# Patient Record
Sex: FEMALE | Race: WHITE | NOT HISPANIC OR LATINO | Employment: PART TIME | URBAN - METROPOLITAN AREA
[De-identification: names, ages, dates, MRNs, and addresses within clinical notes are randomized per-mention and may not be internally consistent; named-entity substitution may affect disease eponyms.]

---

## 2020-05-08 ENCOUNTER — HOSPITAL ENCOUNTER (OUTPATIENT)
Dept: RADIOLOGY | Facility: MEDICAL CENTER | Age: 30
End: 2020-05-08
Attending: FAMILY MEDICINE

## 2020-05-08 DIAGNOSIS — R10.2 PELVIC AND PERINEAL PAIN: ICD-10-CM

## 2020-05-08 PROCEDURE — 76830 TRANSVAGINAL US NON-OB: CPT

## 2022-02-28 ENCOUNTER — HOSPITAL ENCOUNTER (OUTPATIENT)
Dept: RADIOLOGY | Facility: MEDICAL CENTER | Age: 32
End: 2022-02-28
Attending: OBSTETRICS & GYNECOLOGY
Payer: COMMERCIAL

## 2022-02-28 DIAGNOSIS — N91.2 ABSENCE OF MENSTRUATION: ICD-10-CM

## 2022-02-28 PROCEDURE — 76830 TRANSVAGINAL US NON-OB: CPT

## 2022-07-29 ENCOUNTER — PRE-ADMISSION TESTING (OUTPATIENT)
Dept: ADMISSIONS | Facility: MEDICAL CENTER | Age: 32
End: 2022-07-29
Attending: OTOLARYNGOLOGY
Payer: COMMERCIAL

## 2022-07-29 RX ORDER — PREDNISONE 10 MG/1
10 TABLET ORAL DAILY
COMMUNITY
Start: 2022-07-12

## 2022-07-29 RX ORDER — SPIRONOLACTONE 50 MG/1
50 TABLET, FILM COATED ORAL
COMMUNITY
Start: 2022-06-20

## 2022-08-19 ENCOUNTER — HOSPITAL ENCOUNTER (OUTPATIENT)
Dept: RADIOLOGY | Facility: MEDICAL CENTER | Age: 32
End: 2022-08-19
Payer: COMMERCIAL

## 2022-08-22 ENCOUNTER — HOSPITAL ENCOUNTER (OUTPATIENT)
Facility: MEDICAL CENTER | Age: 32
End: 2022-08-22
Attending: OTOLARYNGOLOGY | Admitting: OTOLARYNGOLOGY
Payer: COMMERCIAL

## 2022-08-22 VITALS
BODY MASS INDEX: 33.38 KG/M2 | WEIGHT: 207.67 LBS | HEIGHT: 66 IN | TEMPERATURE: 97.6 F | OXYGEN SATURATION: 98 % | HEART RATE: 61 BPM | RESPIRATION RATE: 15 BRPM | SYSTOLIC BLOOD PRESSURE: 128 MMHG | DIASTOLIC BLOOD PRESSURE: 77 MMHG

## 2022-08-22 RX ORDER — SODIUM CHLORIDE, SODIUM LACTATE, POTASSIUM CHLORIDE, CALCIUM CHLORIDE 600; 310; 30; 20 MG/100ML; MG/100ML; MG/100ML; MG/100ML
INJECTION, SOLUTION INTRAVENOUS CONTINUOUS
Status: DISCONTINUED | OUTPATIENT
Start: 2022-08-22 | End: 2022-08-22 | Stop reason: HOSPADM

## 2022-08-22 NOTE — OR NURSING
1105- Dr Sood to bedside, explaing to patient why procedure needs to be canceled at this time.  Pt getting dressed.

## 2022-09-19 ENCOUNTER — PRE-ADMISSION TESTING (OUTPATIENT)
Dept: ADMISSIONS | Facility: MEDICAL CENTER | Age: 32
End: 2022-09-19
Attending: OTOLARYNGOLOGY
Payer: COMMERCIAL

## 2022-09-25 ENCOUNTER — ANESTHESIA EVENT (OUTPATIENT)
Dept: SURGERY | Facility: MEDICAL CENTER | Age: 32
End: 2022-09-25
Payer: COMMERCIAL

## 2022-09-26 ENCOUNTER — HOSPITAL ENCOUNTER (OUTPATIENT)
Facility: MEDICAL CENTER | Age: 32
End: 2022-09-26
Attending: OTOLARYNGOLOGY | Admitting: OTOLARYNGOLOGY
Payer: COMMERCIAL

## 2022-09-26 ENCOUNTER — ANESTHESIA (OUTPATIENT)
Dept: SURGERY | Facility: MEDICAL CENTER | Age: 32
End: 2022-09-26
Payer: COMMERCIAL

## 2022-09-26 VITALS
HEART RATE: 84 BPM | HEIGHT: 66 IN | SYSTOLIC BLOOD PRESSURE: 115 MMHG | BODY MASS INDEX: 33.84 KG/M2 | TEMPERATURE: 97.5 F | RESPIRATION RATE: 18 BRPM | WEIGHT: 210.54 LBS | DIASTOLIC BLOOD PRESSURE: 74 MMHG | OXYGEN SATURATION: 94 %

## 2022-09-26 DIAGNOSIS — J32.0 CHRONIC MAXILLARY SINUSITIS: ICD-10-CM

## 2022-09-26 DIAGNOSIS — J32.2 CHRONIC ETHMOIDAL SINUSITIS: ICD-10-CM

## 2022-09-26 PROBLEM — J32.1 CHRONIC FRONTAL SINUSITIS: Status: ACTIVE | Noted: 2022-09-26

## 2022-09-26 LAB
ANION GAP SERPL CALC-SCNC: 11 MMOL/L (ref 7–16)
BUN SERPL-MCNC: 7 MG/DL (ref 8–22)
CALCIUM SERPL-MCNC: 9 MG/DL (ref 8.5–10.5)
CHLORIDE SERPL-SCNC: 106 MMOL/L (ref 96–112)
CO2 SERPL-SCNC: 23 MMOL/L (ref 20–33)
CREAT SERPL-MCNC: 0.54 MG/DL (ref 0.5–1.4)
GFR SERPLBLD CREATININE-BSD FMLA CKD-EPI: 126 ML/MIN/1.73 M 2
GLUCOSE SERPL-MCNC: 84 MG/DL (ref 65–99)
GRAM STN SPEC: NORMAL
HCG UR QL: NEGATIVE
POTASSIUM SERPL-SCNC: 3.4 MMOL/L (ref 3.6–5.5)
SIGNIFICANT IND 70042: NORMAL
SITE SITE: NORMAL
SODIUM SERPL-SCNC: 140 MMOL/L (ref 135–145)
SOURCE SOURCE: NORMAL

## 2022-09-26 PROCEDURE — A9270 NON-COVERED ITEM OR SERVICE: HCPCS | Performed by: OTOLARYNGOLOGY

## 2022-09-26 PROCEDURE — 160046 HCHG PACU - 1ST 60 MINS PHASE II: Performed by: OTOLARYNGOLOGY

## 2022-09-26 PROCEDURE — 160002 HCHG RECOVERY MINUTES (STAT): Performed by: OTOLARYNGOLOGY

## 2022-09-26 PROCEDURE — 88305 TISSUE EXAM BY PATHOLOGIST: CPT

## 2022-09-26 PROCEDURE — 80048 BASIC METABOLIC PNL TOTAL CA: CPT

## 2022-09-26 PROCEDURE — 160025 RECOVERY II MINUTES (STATS): Performed by: OTOLARYNGOLOGY

## 2022-09-26 PROCEDURE — 160047 HCHG PACU  - EA ADDL 30 MINS PHASE II: Performed by: OTOLARYNGOLOGY

## 2022-09-26 PROCEDURE — 700111 HCHG RX REV CODE 636 W/ 250 OVERRIDE (IP)

## 2022-09-26 PROCEDURE — 700101 HCHG RX REV CODE 250: Performed by: OTOLARYNGOLOGY

## 2022-09-26 PROCEDURE — 700102 HCHG RX REV CODE 250 W/ 637 OVERRIDE(OP): Performed by: ANESTHESIOLOGY

## 2022-09-26 PROCEDURE — 700105 HCHG RX REV CODE 258: Performed by: ANESTHESIOLOGY

## 2022-09-26 PROCEDURE — 87186 SC STD MICRODIL/AGAR DIL: CPT

## 2022-09-26 PROCEDURE — 700101 HCHG RX REV CODE 250: Performed by: ANESTHESIOLOGY

## 2022-09-26 PROCEDURE — 87077 CULTURE AEROBIC IDENTIFY: CPT

## 2022-09-26 PROCEDURE — 87070 CULTURE OTHR SPECIMN AEROBIC: CPT

## 2022-09-26 PROCEDURE — A9270 NON-COVERED ITEM OR SERVICE: HCPCS | Performed by: ANESTHESIOLOGY

## 2022-09-26 PROCEDURE — 81025 URINE PREGNANCY TEST: CPT

## 2022-09-26 PROCEDURE — C2625 STENT, NON-COR, TEM W/DEL SY: HCPCS | Performed by: OTOLARYNGOLOGY

## 2022-09-26 PROCEDURE — 110371 HCHG SHELL REV 272: Performed by: OTOLARYNGOLOGY

## 2022-09-26 PROCEDURE — 160029 HCHG SURGERY MINUTES - 1ST 30 MINS LEVEL 4: Performed by: OTOLARYNGOLOGY

## 2022-09-26 PROCEDURE — 87205 SMEAR GRAM STAIN: CPT

## 2022-09-26 PROCEDURE — 700111 HCHG RX REV CODE 636 W/ 250 OVERRIDE (IP): Performed by: ANESTHESIOLOGY

## 2022-09-26 PROCEDURE — 700102 HCHG RX REV CODE 250 W/ 637 OVERRIDE(OP): Performed by: OTOLARYNGOLOGY

## 2022-09-26 PROCEDURE — 160041 HCHG SURGERY MINUTES - EA ADDL 1 MIN LEVEL 4: Performed by: OTOLARYNGOLOGY

## 2022-09-26 PROCEDURE — 160035 HCHG PACU - 1ST 60 MINS PHASE I: Performed by: OTOLARYNGOLOGY

## 2022-09-26 PROCEDURE — 160048 HCHG OR STATISTICAL LEVEL 1-5: Performed by: OTOLARYNGOLOGY

## 2022-09-26 PROCEDURE — 88311 DECALCIFY TISSUE: CPT

## 2022-09-26 PROCEDURE — 160036 HCHG PACU - EA ADDL 30 MINS PHASE I: Performed by: OTOLARYNGOLOGY

## 2022-09-26 PROCEDURE — 160009 HCHG ANES TIME/MIN: Performed by: OTOLARYNGOLOGY

## 2022-09-26 PROCEDURE — 87075 CULTR BACTERIA EXCEPT BLOOD: CPT

## 2022-09-26 PROCEDURE — 36415 COLL VENOUS BLD VENIPUNCTURE: CPT

## 2022-09-26 PROCEDURE — 00160 ANES PX NOSE&SINUS NOS: CPT | Performed by: ANESTHESIOLOGY

## 2022-09-26 DEVICE — IMPLANT MOMETASONE FUROATE MINI: Type: IMPLANTABLE DEVICE | Site: NOSE | Status: FUNCTIONAL

## 2022-09-26 RX ORDER — MAGNESIUM SULFATE HEPTAHYDRATE 40 MG/ML
INJECTION, SOLUTION INTRAVENOUS
Status: DISCONTINUED | OUTPATIENT
Start: 2022-09-26 | End: 2022-09-26 | Stop reason: SURG

## 2022-09-26 RX ORDER — EPINEPHRINE 1 MG/ML(1)
AMPUL (ML) INJECTION
Status: DISCONTINUED | OUTPATIENT
Start: 2022-09-26 | End: 2022-09-26 | Stop reason: HOSPADM

## 2022-09-26 RX ORDER — BACITRACIN ZINC 500 [USP'U]/G
OINTMENT TOPICAL
Status: DISCONTINUED
Start: 2022-09-26 | End: 2022-09-26 | Stop reason: HOSPADM

## 2022-09-26 RX ORDER — EPINEPHRINE 1 MG/ML
INJECTION INTRAMUSCULAR; INTRAVENOUS; SUBCUTANEOUS
Status: DISCONTINUED
Start: 2022-09-26 | End: 2022-09-26 | Stop reason: HOSPADM

## 2022-09-26 RX ORDER — HYDROMORPHONE HYDROCHLORIDE 1 MG/ML
0.2 INJECTION, SOLUTION INTRAMUSCULAR; INTRAVENOUS; SUBCUTANEOUS
Status: DISCONTINUED | OUTPATIENT
Start: 2022-09-26 | End: 2022-09-26 | Stop reason: HOSPADM

## 2022-09-26 RX ORDER — OXYCODONE HCL 5 MG/5 ML
5 SOLUTION, ORAL ORAL
Status: DISCONTINUED | OUTPATIENT
Start: 2022-09-26 | End: 2022-09-26 | Stop reason: HOSPADM

## 2022-09-26 RX ORDER — TRANEXAMIC ACID 100 MG/ML
INJECTION, SOLUTION INTRAVENOUS PRN
Status: DISCONTINUED | OUTPATIENT
Start: 2022-09-26 | End: 2022-09-26 | Stop reason: SURG

## 2022-09-26 RX ORDER — DEXMEDETOMIDINE HYDROCHLORIDE 100 UG/ML
INJECTION, SOLUTION INTRAVENOUS PRN
Status: DISCONTINUED | OUTPATIENT
Start: 2022-09-26 | End: 2022-09-26 | Stop reason: SURG

## 2022-09-26 RX ORDER — OXYCODONE HCL 5 MG/5 ML
10 SOLUTION, ORAL ORAL
Status: DISCONTINUED | OUTPATIENT
Start: 2022-09-26 | End: 2022-09-26 | Stop reason: HOSPADM

## 2022-09-26 RX ORDER — CEFAZOLIN SODIUM 1 G/3ML
INJECTION, POWDER, FOR SOLUTION INTRAMUSCULAR; INTRAVENOUS PRN
Status: DISCONTINUED | OUTPATIENT
Start: 2022-09-26 | End: 2022-09-26 | Stop reason: SURG

## 2022-09-26 RX ORDER — CELECOXIB 200 MG/1
400 CAPSULE ORAL ONCE
Status: COMPLETED | OUTPATIENT
Start: 2022-09-26 | End: 2022-09-26

## 2022-09-26 RX ORDER — MIDAZOLAM HYDROCHLORIDE 1 MG/ML
INJECTION INTRAMUSCULAR; INTRAVENOUS PRN
Status: DISCONTINUED | OUTPATIENT
Start: 2022-09-26 | End: 2022-09-26 | Stop reason: SURG

## 2022-09-26 RX ORDER — AMOXICILLIN AND CLAVULANATE POTASSIUM 875; 125 MG/1; MG/1
1 TABLET, FILM COATED ORAL 2 TIMES DAILY
Qty: 14 TABLET | Refills: 0 | Status: SHIPPED | OUTPATIENT
Start: 2022-09-26 | End: 2022-10-03

## 2022-09-26 RX ORDER — EPINEPHRINE 1 MG/ML(1)
AMPUL (ML) INJECTION
Status: DISCONTINUED
Start: 2022-09-26 | End: 2022-09-26 | Stop reason: HOSPADM

## 2022-09-26 RX ORDER — IBUPROFEN 800 MG/1
800 TABLET ORAL
Qty: 30 TABLET | Refills: 0 | Status: SHIPPED | OUTPATIENT
Start: 2022-09-26 | End: 2022-10-06

## 2022-09-26 RX ORDER — KETOROLAC TROMETHAMINE 30 MG/ML
INJECTION, SOLUTION INTRAMUSCULAR; INTRAVENOUS PRN
Status: DISCONTINUED | OUTPATIENT
Start: 2022-09-26 | End: 2022-09-26 | Stop reason: SURG

## 2022-09-26 RX ORDER — OXYCODONE HYDROCHLORIDE 5 MG/1
5 TABLET ORAL EVERY 4 HOURS PRN
Qty: 10 TABLET | Refills: 0 | Status: SHIPPED | OUTPATIENT
Start: 2022-09-26 | End: 2022-10-01

## 2022-09-26 RX ORDER — ACETAMINOPHEN 500 MG
1000 TABLET ORAL EVERY 8 HOURS
Qty: 60 TABLET | Refills: 0 | Status: SHIPPED | OUTPATIENT
Start: 2022-09-26 | End: 2022-10-06

## 2022-09-26 RX ORDER — HALOPERIDOL 5 MG/ML
1 INJECTION INTRAMUSCULAR
Status: DISCONTINUED | OUTPATIENT
Start: 2022-09-26 | End: 2022-09-26 | Stop reason: HOSPADM

## 2022-09-26 RX ORDER — HYDRALAZINE HYDROCHLORIDE 20 MG/ML
5 INJECTION INTRAMUSCULAR; INTRAVENOUS
Status: DISCONTINUED | OUTPATIENT
Start: 2022-09-26 | End: 2022-09-26 | Stop reason: HOSPADM

## 2022-09-26 RX ORDER — ONDANSETRON 2 MG/ML
INJECTION INTRAMUSCULAR; INTRAVENOUS PRN
Status: DISCONTINUED | OUTPATIENT
Start: 2022-09-26 | End: 2022-09-26 | Stop reason: SURG

## 2022-09-26 RX ORDER — SCOLOPAMINE TRANSDERMAL SYSTEM 1 MG/1
1 PATCH, EXTENDED RELEASE TRANSDERMAL
Status: DISCONTINUED | OUTPATIENT
Start: 2022-09-26 | End: 2022-09-26 | Stop reason: HOSPADM

## 2022-09-26 RX ORDER — ALBUTEROL SULFATE 2.5 MG/3ML
2.5 SOLUTION RESPIRATORY (INHALATION)
Status: DISCONTINUED | OUTPATIENT
Start: 2022-09-26 | End: 2022-09-26 | Stop reason: HOSPADM

## 2022-09-26 RX ORDER — DEXAMETHASONE SODIUM PHOSPHATE 4 MG/ML
INJECTION, SOLUTION INTRA-ARTICULAR; INTRALESIONAL; INTRAMUSCULAR; INTRAVENOUS; SOFT TISSUE PRN
Status: DISCONTINUED | OUTPATIENT
Start: 2022-09-26 | End: 2022-09-26 | Stop reason: SURG

## 2022-09-26 RX ORDER — KETAMINE HYDROCHLORIDE 50 MG/ML
INJECTION, SOLUTION INTRAMUSCULAR; INTRAVENOUS PRN
Status: DISCONTINUED | OUTPATIENT
Start: 2022-09-26 | End: 2022-09-26 | Stop reason: SURG

## 2022-09-26 RX ORDER — HYDROMORPHONE HYDROCHLORIDE 1 MG/ML
0.1 INJECTION, SOLUTION INTRAMUSCULAR; INTRAVENOUS; SUBCUTANEOUS
Status: DISCONTINUED | OUTPATIENT
Start: 2022-09-26 | End: 2022-09-26 | Stop reason: HOSPADM

## 2022-09-26 RX ORDER — LABETALOL HYDROCHLORIDE 5 MG/ML
5 INJECTION, SOLUTION INTRAVENOUS
Status: DISCONTINUED | OUTPATIENT
Start: 2022-09-26 | End: 2022-09-26 | Stop reason: HOSPADM

## 2022-09-26 RX ORDER — LIDOCAINE HYDROCHLORIDE 10 MG/ML
INJECTION, SOLUTION EPIDURAL; INFILTRATION; INTRACAUDAL; PERINEURAL
Status: DISCONTINUED
Start: 2022-09-26 | End: 2022-09-26 | Stop reason: HOSPADM

## 2022-09-26 RX ORDER — MIDAZOLAM HYDROCHLORIDE 1 MG/ML
1 INJECTION INTRAMUSCULAR; INTRAVENOUS
Status: DISCONTINUED | OUTPATIENT
Start: 2022-09-26 | End: 2022-09-26 | Stop reason: HOSPADM

## 2022-09-26 RX ORDER — HYDROMORPHONE HYDROCHLORIDE 1 MG/ML
0.4 INJECTION, SOLUTION INTRAMUSCULAR; INTRAVENOUS; SUBCUTANEOUS
Status: DISCONTINUED | OUTPATIENT
Start: 2022-09-26 | End: 2022-09-26 | Stop reason: HOSPADM

## 2022-09-26 RX ORDER — ACETAMINOPHEN 500 MG
1000 TABLET ORAL ONCE
Status: COMPLETED | OUTPATIENT
Start: 2022-09-26 | End: 2022-09-26

## 2022-09-26 RX ORDER — LIDOCAINE HYDROCHLORIDE 40 MG/ML
SOLUTION TOPICAL PRN
Status: DISCONTINUED | OUTPATIENT
Start: 2022-09-26 | End: 2022-09-26 | Stop reason: SURG

## 2022-09-26 RX ORDER — SODIUM CHLORIDE, SODIUM LACTATE, POTASSIUM CHLORIDE, CALCIUM CHLORIDE 600; 310; 30; 20 MG/100ML; MG/100ML; MG/100ML; MG/100ML
INJECTION, SOLUTION INTRAVENOUS CONTINUOUS
Status: DISCONTINUED | OUTPATIENT
Start: 2022-09-26 | End: 2022-09-26 | Stop reason: HOSPADM

## 2022-09-26 RX ORDER — OXYMETAZOLINE HYDROCHLORIDE 0.05 G/100ML
SPRAY NASAL
Status: DISCONTINUED
Start: 2022-09-26 | End: 2022-09-26 | Stop reason: HOSPADM

## 2022-09-26 RX ORDER — ONDANSETRON 4 MG/1
4 TABLET, ORALLY DISINTEGRATING ORAL EVERY 6 HOURS PRN
Qty: 10 TABLET | Refills: 0 | Status: SHIPPED | OUTPATIENT
Start: 2022-09-26

## 2022-09-26 RX ORDER — SUCCINYLCHOLINE CHLORIDE 20 MG/ML
INJECTION INTRAMUSCULAR; INTRAVENOUS PRN
Status: DISCONTINUED | OUTPATIENT
Start: 2022-09-26 | End: 2022-09-26 | Stop reason: SURG

## 2022-09-26 RX ORDER — DIPHENHYDRAMINE HYDROCHLORIDE 50 MG/ML
12.5 INJECTION INTRAMUSCULAR; INTRAVENOUS
Status: DISCONTINUED | OUTPATIENT
Start: 2022-09-26 | End: 2022-09-26 | Stop reason: HOSPADM

## 2022-09-26 RX ORDER — GABAPENTIN 300 MG/1
300 CAPSULE ORAL ONCE
Status: COMPLETED | OUTPATIENT
Start: 2022-09-26 | End: 2022-09-26

## 2022-09-26 RX ORDER — SODIUM CHLORIDE, SODIUM LACTATE, POTASSIUM CHLORIDE, CALCIUM CHLORIDE 600; 310; 30; 20 MG/100ML; MG/100ML; MG/100ML; MG/100ML
INJECTION, SOLUTION INTRAVENOUS
Status: DISCONTINUED | OUTPATIENT
Start: 2022-09-26 | End: 2022-09-26 | Stop reason: SURG

## 2022-09-26 RX ORDER — LIDOCAINE HYDROCHLORIDE AND EPINEPHRINE 10; 10 MG/ML; UG/ML
INJECTION, SOLUTION INFILTRATION; PERINEURAL
Status: DISCONTINUED | OUTPATIENT
Start: 2022-09-26 | End: 2022-09-26 | Stop reason: HOSPADM

## 2022-09-26 RX ORDER — OXYMETAZOLINE HYDROCHLORIDE 0.05 G/100ML
SPRAY NASAL
Status: DISCONTINUED | OUTPATIENT
Start: 2022-09-26 | End: 2022-09-26 | Stop reason: HOSPADM

## 2022-09-26 RX ORDER — LIDOCAINE HYDROCHLORIDE 20 MG/ML
INJECTION, SOLUTION EPIDURAL; INFILTRATION; INTRACAUDAL; PERINEURAL PRN
Status: DISCONTINUED | OUTPATIENT
Start: 2022-09-26 | End: 2022-09-26 | Stop reason: SURG

## 2022-09-26 RX ADMIN — LIDOCAINE HYDROCHLORIDE 4 ML: 40 SOLUTION TOPICAL at 13:12

## 2022-09-26 RX ADMIN — LIDOCAINE HYDROCHLORIDE 100 MG: 20 INJECTION, SOLUTION EPIDURAL; INFILTRATION; INTRACAUDAL at 13:06

## 2022-09-26 RX ADMIN — KETOROLAC TROMETHAMINE 15 MG: 30 INJECTION, SOLUTION INTRAMUSCULAR at 14:34

## 2022-09-26 RX ADMIN — GABAPENTIN 300 MG: 300 CAPSULE ORAL at 11:11

## 2022-09-26 RX ADMIN — DEXMEDETOMIDINE 20 MCG: 200 INJECTION, SOLUTION INTRAVENOUS at 14:24

## 2022-09-26 RX ADMIN — TRANEXAMIC ACID 1000 MG: 100 INJECTION, SOLUTION INTRAVENOUS at 14:10

## 2022-09-26 RX ADMIN — PROPOFOL 150 MG: 10 INJECTION, EMULSION INTRAVENOUS at 13:11

## 2022-09-26 RX ADMIN — MAGNESIUM SULFATE HEPTAHYDRATE 4 G: 40 INJECTION, SOLUTION INTRAVENOUS at 13:15

## 2022-09-26 RX ADMIN — CEFAZOLIN 2 G: 330 INJECTION, POWDER, FOR SOLUTION INTRAMUSCULAR; INTRAVENOUS at 13:15

## 2022-09-26 RX ADMIN — ACETAMINOPHEN 1000 MG: 500 TABLET ORAL at 11:11

## 2022-09-26 RX ADMIN — SCOPOLAMINE 1 PATCH: 1.5 PATCH, EXTENDED RELEASE TRANSDERMAL at 11:11

## 2022-09-26 RX ADMIN — MIDAZOLAM 1 MG: 1 INJECTION INTRAMUSCULAR; INTRAVENOUS at 16:04

## 2022-09-26 RX ADMIN — DEXAMETHASONE SODIUM PHOSPHATE 10 MG: 4 INJECTION, SOLUTION INTRA-ARTICULAR; INTRALESIONAL; INTRAMUSCULAR; INTRAVENOUS; SOFT TISSUE at 13:15

## 2022-09-26 RX ADMIN — KETAMINE HYDROCHLORIDE 25 MG: 50 INJECTION INTRAMUSCULAR; INTRAVENOUS at 14:02

## 2022-09-26 RX ADMIN — MIDAZOLAM HYDROCHLORIDE 2 MG: 1 INJECTION, SOLUTION INTRAMUSCULAR; INTRAVENOUS at 13:05

## 2022-09-26 RX ADMIN — SODIUM CHLORIDE, POTASSIUM CHLORIDE, SODIUM LACTATE AND CALCIUM CHLORIDE: 600; 310; 30; 20 INJECTION, SOLUTION INTRAVENOUS at 13:05

## 2022-09-26 RX ADMIN — KETAMINE HYDROCHLORIDE 50 MG: 50 INJECTION INTRAMUSCULAR; INTRAVENOUS at 13:15

## 2022-09-26 RX ADMIN — SUCCINYLCHOLINE CHLORIDE 95.6 MG: 20 INJECTION, SOLUTION INTRAMUSCULAR; INTRAVENOUS; PARENTERAL at 13:11

## 2022-09-26 RX ADMIN — FENTANYL CITRATE 50 MCG: 50 INJECTION, SOLUTION INTRAMUSCULAR; INTRAVENOUS at 14:38

## 2022-09-26 RX ADMIN — CELECOXIB 400 MG: 200 CAPSULE ORAL at 11:11

## 2022-09-26 RX ADMIN — ONDANSETRON 8 MG: 2 INJECTION INTRAMUSCULAR; INTRAVENOUS at 14:34

## 2022-09-26 ASSESSMENT — PAIN DESCRIPTION - PAIN TYPE: TYPE: SURGICAL PAIN

## 2022-09-26 NOTE — OR NURSING
1515 received handoff report and assumed care from BEN Montes. Patient sedate/somnolent with OPA in place    1542 Awakens. OPA removed. Placed on 2L via nasal cannula. Denies pain or nausea    1550 , Juanito, called and given update. Patient weeping.     1600 Report back to BEN Montes

## 2022-09-26 NOTE — ANESTHESIA TIME REPORT
Anesthesia Start and Stop Event Times     Date Time Event    9/26/2022 1236 Ready for Procedure     1305 Anesthesia Start     1459 Anesthesia Stop        Responsible Staff  09/26/22    Name Role Begin End    Agueda Kumari M.D. Anesth 1305 [id number]        Overtime Reason:  no overtime (within assigned shift)    Comments:

## 2022-09-26 NOTE — OR SURGEON
Immediate Post OP Note    PreOp Diagnosis: Chronic Right sided rhinosinusitis      PostOp Diagnosis: Same      Procedure(s):  RGHT ENDOSCOPIC MAXILLARY ANTROSTOMY, WITH MAXILLARY TISSUE REMOVAL, RIGHT ENDOSCOPIC ETHMOIDECTOMY; PARTIAL RIGHT ENDOSCOPIC FRONTAL SINUS EXPLORATION, STEREOTACTIC COMPUTER ASSISTED (NAVIGATIONAL) PROCEDURE CRANIAL,EXTRADURAL - Wound Class: Clean Contaminated  ENDOSCOPY, PARANASAL SINUS, WITH TOTAL ETHMOIDECTOMY, SPHENOIDOTOMY, MAXILLARY ANTROSTOMY, SPHEN+MAX SIN TISS REM, AND EXPLORATION FRONT SIN - Wound Class: Clean Contaminated    Surgeon(s):  Cassy Sood M.D.    Anesthesiologist/Type of Anesthesia:  Anesthesiologist: Agueda Kumari M.D./General    Surgical Staff:  Circulator: Rachael Selby R.N.  Relief Circulator: Abigail Huynh R.N.  Scrub Person: Yamileth Watson    Specimens removed if any:  ID Type Source Tests Collected by Time Destination   1 : RIGHT MAXILLARY SINUS SWAB Other Other GRAM STAIN ONLY, AEROBIC/ANAEROBIC CULTURE (SURGERY) Cassy Sood M.D. 9/26/2022  1:50 PM    A : RIGHT SINUS CONTENTS Tissue Sinus PATHOLOGY SPECIMEN Cassy Sood M.D. 9/26/2022  2:05 PM        Estimated Blood Loss: 100 ml    Findings: severe edema right middle meatus, gross purulence filling right maxillary sinus    Complications: None        9/26/2022 3:10 PM Cassy Sood M.D.

## 2022-09-26 NOTE — OP REPORT
PreOp Diagnosis: Chronic Right sided rhinosinusitis      PostOp Diagnosis: Same      Procedure(s):  RGHT ENDOSCOPIC MAXILLARY ANTROSTOMY, WITH MAXILLARY TISSUE REMOVAL, RIGHT ENDOSCOPIC ETHMOIDECTOMY; PARTIAL RIGHT ENDOSCOPIC FRONTAL SINUS EXPLORATION, STEREOTACTIC COMPUTER ASSISTED (NAVIGATIONAL) PROCEDURE CRANIAL,EXTRADURAL - Wound Class: Clean Contaminated  ENDOSCOPY, PARANASAL SINUS, WITH TOTAL ETHMOIDECTOMY, SPHENOIDOTOMY, MAXILLARY ANTROSTOMY, SPHEN+MAX SIN TISS REM, AND EXPLORATION FRONT SIN - Wound Class: Clean Contaminated    Surgeon(s):  Cassy Sood M.D.    Anesthesiologist/Type of Anesthesia:  Anesthesiologist: Agueda Kumari M.D./General    Surgical Staff:  Circulator: Rachael Selby R.N.  Relief Circulator: Abigail Huynh R.N.  Scrub Person: Yamileth Watson    Specimens removed if any:  ID Type Source Tests Collected by Time Destination   1 : RIGHT MAXILLARY SINUS SWAB Other Other GRAM STAIN ONLY, AEROBIC/ANAEROBIC CULTURE (SURGERY) Cassy Sood M.D. 9/26/2022  1:50 PM    A : RIGHT SINUS CONTENTS Tissue Sinus PATHOLOGY SPECIMEN Cassy Sood M.D. 9/26/2022  2:05 PM        Estimated Blood Loss: 100 ml    Findings: severe edema right middle meatus, gross purulence filling right maxillary sinus    Complications: None    Procedure in detail: The patient was positively identified in the preoperative holding area and informed consent was obtained for the operation.  Right nostril was marked.  She was brought back to the operating room and placed in supine position on the OR table.  General endotracheal anesthesia was induced and she was endotracheally intubated.  Image guidance was set up and registered to an acceptable degree of accuracy.  The right greater palate teen was injected with 1% lidocaine with 1-100,000 epinephrine and Afrin pledgets were placed in both nostrils.  Timeout procedure was performed.  She was draped in the standard fashion.   Throughout the procedure 1-1000 epinephrine was used topically on pledgets for hemostasis.    The 0 degree endoscope was used to visualize the right middle meatus.  The lateral nasal wall, axilla, and head of the middle turbinate were injected with 1% lidocaine with 1-100,000 epinephrine.  Epinephrine soaked pledget was used in the middle meatus.  The uncinate was noted to be outfractured secondary to edema and infection of the middle meatus.  This was then fully removed with through-cutting instruments.  The maxillary sinus ostium was identified posteriorly with gross purulence draining from it.  This was sampled and sent for culture.  Backbiter was used to take down the remainder of the medial wall of the maxillary sinus above the inferior turbinate.  Angled scope was used to confirm complete removal of the uncinate and complete evacuation of purulence from the sinus.  This was then copiously irrigated.  The ethmoid bulla was visualized and taken down to the basal lamella with through-cutting instruments.  Using the image guidance the frontal sinus outflow tract was identified and enlarged using through-cutting instruments.  The propel mini stent was placed within the frontal sinus outflow tract.  Positive septal was placed within the middle meatus and soaked with lidocaine and epinephrine.  The nasal cavity, nasopharynx, and oropharynx were copiously suctioned.  This completed the procedure.  Patient was returned to the care of anesthesia where she was awakened extubated and taken to the PACU in stable condition.    All counts were correct.

## 2022-09-26 NOTE — DISCHARGE INSTRUCTIONS
If any questions arise, call your provider, DR. PAREDES [phone]  If your provider is not available, please feel free to call the [facility] at [phone].    MEDICATIONS: Resume taking daily medication.  Take prescribed pain medication with food.  If no medication is prescribed, you may take non-aspirin pain medication if needed.  PAIN MEDICATION CAN BE VERY CONSTIPATING.  Take a stool softener or laxative such as senokot, pericolace, or milk of magnesia if needed.    Last pain medication given at ______. You may have next dose of pain medication at _______.    What to Expect Post Anesthesia    Rest and take it easy for the first 24 hours.  A responsible adult is recommended to remain with you during that time.  It is normal to feel sleepy.  We encourage you to not do anything that requires balance, judgment or coordination.    FOR 24 HOURS DO NOT:  Drive, operate machinery or run household appliances.  Drink beer or alcoholic beverages.  Make important decisions or sign legal documents.    To avoid nausea, slowly advance diet as tolerated, avoiding spicy or greasy foods for the first day.  Add more substantial food to your diet according to your provider's instructions.  Babies can be fed formula or breast milk as soon as they are hungry.  INCREASE FLUIDS AND FIBER TO AVOID CONSTIPATION.    MILD FLU-LIKE SYMPTOMS ARE NORMAL.  YOU MAY EXPERIENCE GENERALIZED MUSCLE ACHES, THROAT IRRITATION, HEADACHE AND/OR SOME NAUSEA.    [id number] - [place] - [address] [facility]

## 2022-09-26 NOTE — ANESTHESIA PREPROCEDURE EVALUATION
Case: [id number] Date/Time: 09/26/22 1200    Procedures:       RGHT ENDOSCOPIC MAXILLARY ANTROSTOMY, WITH MAXILLARY TISSUE REMOVAL, RIGHT ENDOSCOPIC ETHMOIDECTOMY; PARTIAL RIGHT ENDOSCOPIC FRONTAL SINUS EXPLORATION, STEREOTACTIC COMPUTER ASSISTED (NAVIGATIONAL) PROCEDURE CRANIAL,EXTRADURAL (Nose)      ENDOSCOPY, PARANASAL SINUS, WITH TOTAL ETHMOIDECTOMY, SPHENOIDOTOMY, MAXILLARY ANTROSTOMY, SPHEN+MAX SIN TISS REM, AND EXPLORATION FRONT SIN    Pre-op diagnosis: J32.0, J32.1, J32.2    Location: [facility] ROOM 22 / SURGERY SAME DAY [facility]    Surgeons: Cassy Sood M.D.        32yo F here for FESS    Has PCOS, for which she takes metformin. Denies DM    Relevant Problems   No relevant active problems       Physical Exam    Airway   Mallampati: II  TM distance: >3 FB  Neck ROM: full       Cardiovascular - normal exam  Rhythm: regular  Rate: normal  (-) murmur     Dental - normal exam           Pulmonary - normal exam  Breath sounds clear to auscultation     Abdominal    Neurological - normal exam                 Anesthesia Plan    ASA 2       Plan - general       Airway plan will be ETT          Induction: intravenous    Postoperative Plan: Postoperative administration of opioids is intended.    Pertinent diagnostic labs and testing reviewed    Informed Consent:    Anesthetic plan and risks discussed with patient.    Use of blood products discussed with: patient whom consented to blood products.

## 2022-09-26 NOTE — OR NURSING
1449 Pt to PACU from OR. Report from anesthesia and OR RN. On 4L O2 via mask and oral airway. Respirations even and unlabored. VSS.     1521 Report given to BEN Wyatt.    1551 Patient meets phase 2 criteria.    1600 Report received from BEN Wyatt.    1614 Patient's , Juanito, brought to bedside. Dr. Sood at bedside discussing surgery findings with patient and their .    1633 Discharge instructions discussed with patient's , Juanito. All questions answered. Verbalized understanding.    1712 Patient up getting dressed. PIV removed with tip intact.    1718 Pt escorted out of department in wheelchair with all belongings. Discharged home with a responsible adult.

## 2022-09-26 NOTE — ANESTHESIA PROCEDURE NOTES
Airway    Date/Time: 9/26/2022 1:12 PM  Performed by: Agueda Kumari M.D.  Authorized by: Agueda Kumari M.D.     Location:  OR  Urgency:  Elective  Indications for Airway Management:  Anesthesia      Spontaneous Ventilation: absent    Sedation Level:  Deep  Preoxygenated: Yes    Patient Position:  Sniffing  Mask Difficulty Assessment:  0 - not attempted  Final Airway Type:  Endotracheal airway  Final Endotracheal Airway:  ETT  Cuffed: Yes    Technique Used for Successful ETT Placement:  Direct laryngoscopy    Insertion Site:  Oral  Blade Type:  Romana  Laryngoscope Blade/Videolaryngoscope Blade Size:  3  ETT Size (mm):  7.0  Leak Pressue (cm H2O):  24  Measured from:  Teeth  ETT to Teeth (cm):  22  Placement Verified by: auscultation and capnometry    Cormack-Lehane Classification:  Grade I - full view of glottis  Number of Attempts at Approach:  1

## 2022-09-26 NOTE — ANESTHESIA POSTPROCEDURE EVALUATION
Patient: Vivian Dewey    Procedure Summary     Date: 09/26/22 Room / Location: [facility] ROOM 22 / SURGERY SAME DAY [facility]    Anesthesia Start: 1305 Anesthesia Stop: 1459    Procedures:       RGHT ENDOSCOPIC MAXILLARY ANTROSTOMY, WITH MAXILLARY TISSUE REMOVAL, RIGHT ENDOSCOPIC ETHMOIDECTOMY; PARTIAL RIGHT ENDOSCOPIC FRONTAL SINUS EXPLORATION, STEREOTACTIC COMPUTER ASSISTED (NAVIGATIONAL) PROCEDURE CRANIAL,EXTRADURAL (Right: Nose)      ENDOSCOPY, PARANASAL SINUS, WITH TOTAL ETHMOIDECTOMY, SPHENOIDOTOMY, MAXILLARY ANTROSTOMY, SPHEN+MAX SIN TISS REM, AND EXPLORATION FRONT SIN (Right: Nose) Diagnosis: (Right chronic rhinosinusitis)    Surgeons: Cassy Sood M.D. Responsible Provider: Agueda Kumari M.D.    Anesthesia Type: general ASA Status: 2          Final Anesthesia Type: general  Last vitals  BP   Blood Pressure: (!) 93/45    Temp   36.4 °C (97.5 °F)    Pulse   85   Resp   16    SpO2   95 %      Anesthesia Post Evaluation    Patient location during evaluation: PACU  Patient participation: complete - patient participated  Level of consciousness: awake and alert    Airway patency: patent  Anesthetic complications: no  Cardiovascular status: hemodynamically stable  Respiratory status: acceptable  Hydration status: euvolemic    PONV: none          No notable events documented.     Nurse Pain Score: 0 (NPRS)

## 2022-09-26 NOTE — DISCHARGE INSTR - OTHER INFO
SINUS SURGERY POST-OPERATIVE INSTRUCTIONS      When to call your doctor…   severe headache accompanied by nausea, vomiting, or unusual change in behavior   any change in vision, including blurring or double vision   light causing pain in your eyes    swelling or bruising around the eyes   fever (100.6 degrees or higher)    stiffening neck   diarrhea    new rashes   clear, watery discharge from your nose    You can call Dr. Sood’s office at [phone] from 8 AM - 5PM Monday- Friday for questions or medication refills.  For concerns after hours, this same number will allow you to reach the provider on call.      BLEEDING            Oozing from the nose is common for 24-48 hours following surgery. It may occur for 12-24 hours after each post-operative visit also. You should probably put an old pillow case or towel on your pillow.            Occasionally, persistent bleeding from the nose can develop. If this occurs, sit upright and breathe through your nose for 5-10 minutes. This should relieve most bleeding. If it does not, or if the bleeding is heavy, contact our office at the number above.           sleep with your head elevated on an extra pillow to minimize the oozing.            After a couple of days, the discharge from your nose may turn maroon or dark brown. This change is due to old blood and is normal. It does not mean that the nose or sinuses are infected.      NAUSEA AND VOMITING            Nausea and vomiting following anesthesia are common.            The nausea usually fades after about 12-24 hours.            Try to sip liquids to avoid dehydration during these periods.            If the nausea is severe notify our office.    What you need to do after surgery…    IRRIGATE 3 TIMES PER DAY  Beginning the morning after surgery you should irrigate your nose three times a day. Use saline (salt-water) nasal irrigation starting the morning following surgery. A simple system to use is [employer]’s Sinus  Rinse, which can be purchased “over the counter” at many pharmacies.  The object is to clear out as much of the dried blood and mucus as possible.  If your nose feels dry or stuffy between irrigations, you can also use saline nasal spray. Try to keep the inside of your nose as moist as possible. A humidifier can help as well. The more you use the saline irrigation and spray, the easier the postoperative visits will go.      MEDICATIONS           Please take all prescribed medications as directed and complete the course of each medication.  These medications are essential components of your care, promoting rapid and correct healing. Failure to take them properly can lead to infection and scarring within your sinuses.           Nasal steroid sprays may be restarted one week after the surgery. If you have a question, please contact the office.   PAIN            Use the pain medicine as needed. As soon as you feel ready, try to switch to an over the counter pain medicine like extra-strength acetaminophen (for example, Tylenol).            For the first two to three weeks after surgery, do NOT use medications which contain aspirin as these can cause bleeding. Talk to Dr. Sood about restarting blood thinning medications after surgery.    What you should expect following surgery…           HEALING: It will take  4-6 weeks to heal after surgery, sometimes longer.           FATIGUE: for two or three days following the surgery is common. You will want to take it easy for a few days following surgery. You should also avoid strenuous physical activity for a few days. Moderate activity (like going for a walk) is acceptable.           MUSCLE PAIN: muscles may ache all over like you have lifted weights or strained muscles, particularly in the first day or two after surgery. Take Tylenol (acetaminophen) for this and stretch your muscles--it will subside.           BENDING, LIFTING, STRAINING : Placing your head below your  waist (e.g., to tie your shoes), lifting anything over 10 pounds (including children) and straining will all increase the risk of bleeding. You should avoid these activities for one full week following surgery.            WORK: Plan to be out for a week and return as soon as you feel up to it.            TRAVEL Many of our patients come from some distance. We prefer you stay in the local area overnight following the surgery. If necessary, you may travel by air 48 hours after the surgery.           ENVIRONMENT: stay out of alex or smoky environments as much as possible. This includes tobacco smoke.  NOSE BLOWING            You may sniff (even vigorously) if you feel you need to clear your nose. Realize that the interior of the nose will be swollen for 2-3 days and may not clear - even with the most forceful attempts.            Blowing your nose too early in the healing process can be dangerous.            You may begin to blow your nose lightly 3 days following the surgery.   SWIMMING            You should not swim for at least four weeks following surgery. While the nose heals, pool and lake water can inflame or infect it.    POST-OPERATIVE VISITS            The care of your sinuses does not end when the surgery is completed. During these visits, your physician will examine your nose to ensure the healing is progressing. Removal of dried blood and mucus may also be necessary while the nose regains its ability to care for itself. In some cases, small revisions and adjustments of the healing under local anesthesia are necessary to ensure scar tissue does not block the sinuses.           You may want to take one tablets of the pain medicine about 30 minutes prior to your first visit. Because the medicine contains a narcotic, you will need to have someone drive you home after the visit. Taking the medicine before the visit allows most patients to undergo the exams with minimal discomfort.

## 2022-09-30 LAB
BACTERIA SPEC ANAEROBE CULT: NORMAL
BACTERIA WND AEROBE CULT: ABNORMAL
BACTERIA WND AEROBE CULT: ABNORMAL
GRAM STN SPEC: ABNORMAL
SIGNIFICANT IND 70042: ABNORMAL
SIGNIFICANT IND 70042: NORMAL
SITE SITE: ABNORMAL
SITE SITE: NORMAL
SOURCE SOURCE: ABNORMAL
SOURCE SOURCE: NORMAL

## 2022-10-04 ENCOUNTER — APPOINTMENT (OUTPATIENT)
Dept: RADIOLOGY | Facility: MEDICAL CENTER | Age: 32
End: 2022-10-04
Attending: EMERGENCY MEDICINE
Payer: COMMERCIAL

## 2022-10-04 ENCOUNTER — HOSPITAL ENCOUNTER (EMERGENCY)
Facility: MEDICAL CENTER | Age: 32
End: 2022-10-04
Attending: EMERGENCY MEDICINE
Payer: COMMERCIAL

## 2022-10-04 VITALS
RESPIRATION RATE: 16 BRPM | DIASTOLIC BLOOD PRESSURE: 73 MMHG | BODY MASS INDEX: 34.47 KG/M2 | HEIGHT: 66 IN | TEMPERATURE: 97.5 F | HEART RATE: 60 BPM | WEIGHT: 214.51 LBS | SYSTOLIC BLOOD PRESSURE: 105 MMHG | OXYGEN SATURATION: 96 %

## 2022-10-04 DIAGNOSIS — R06.02 SHORTNESS OF BREATH: ICD-10-CM

## 2022-10-04 DIAGNOSIS — R07.9 NONSPECIFIC CHEST PAIN: ICD-10-CM

## 2022-10-04 LAB
ALBUMIN SERPL BCP-MCNC: 4.5 G/DL (ref 3.2–4.9)
ALBUMIN/GLOB SERPL: 1.3 G/DL
ALP SERPL-CCNC: 78 U/L (ref 30–99)
ALT SERPL-CCNC: 11 U/L (ref 2–50)
ANION GAP SERPL CALC-SCNC: 12 MMOL/L (ref 7–16)
AST SERPL-CCNC: 17 U/L (ref 12–45)
BASOPHILS # BLD AUTO: 0.9 % (ref 0–1.8)
BASOPHILS # BLD: 0.08 K/UL (ref 0–0.12)
BILIRUB SERPL-MCNC: 0.2 MG/DL (ref 0.1–1.5)
BUN SERPL-MCNC: 9 MG/DL (ref 8–22)
CALCIUM SERPL-MCNC: 9.4 MG/DL (ref 8.5–10.5)
CHLORIDE SERPL-SCNC: 105 MMOL/L (ref 96–112)
CO2 SERPL-SCNC: 21 MMOL/L (ref 20–33)
CREAT SERPL-MCNC: 0.62 MG/DL (ref 0.5–1.4)
EKG IMPRESSION: NORMAL
EOSINOPHIL # BLD AUTO: 0.17 K/UL (ref 0–0.51)
EOSINOPHIL NFR BLD: 1.9 % (ref 0–6.9)
ERYTHROCYTE [DISTWIDTH] IN BLOOD BY AUTOMATED COUNT: 44.4 FL (ref 35.9–50)
GFR SERPLBLD CREATININE-BSD FMLA CKD-EPI: 122 ML/MIN/1.73 M 2
GLOBULIN SER CALC-MCNC: 3.4 G/DL (ref 1.9–3.5)
GLUCOSE SERPL-MCNC: 103 MG/DL (ref 65–99)
HCG SERPL QL: NEGATIVE
HCT VFR BLD AUTO: 42.8 % (ref 37–47)
HGB BLD-MCNC: 14.1 G/DL (ref 12–16)
IMM GRANULOCYTES # BLD AUTO: 0.02 K/UL (ref 0–0.11)
IMM GRANULOCYTES NFR BLD AUTO: 0.2 % (ref 0–0.9)
LYMPHOCYTES # BLD AUTO: 2.91 K/UL (ref 1–4.8)
LYMPHOCYTES NFR BLD: 32.1 % (ref 22–41)
MCH RBC QN AUTO: 28.3 PG (ref 27–33)
MCHC RBC AUTO-ENTMCNC: 32.9 G/DL (ref 33.6–35)
MCV RBC AUTO: 85.9 FL (ref 81.4–97.8)
MONOCYTES # BLD AUTO: 0.53 K/UL (ref 0–0.85)
MONOCYTES NFR BLD AUTO: 5.8 % (ref 0–13.4)
NEUTROPHILS # BLD AUTO: 5.35 K/UL (ref 2–7.15)
NEUTROPHILS NFR BLD: 59.1 % (ref 44–72)
NRBC # BLD AUTO: 0 K/UL
NRBC BLD-RTO: 0 /100 WBC
NT-PROBNP SERPL IA-MCNC: 7 PG/ML (ref 0–125)
PLATELET # BLD AUTO: 421 K/UL (ref 164–446)
PMV BLD AUTO: 9.7 FL (ref 9–12.9)
POTASSIUM SERPL-SCNC: 4.3 MMOL/L (ref 3.6–5.5)
PROT SERPL-MCNC: 7.9 G/DL (ref 6–8.2)
RBC # BLD AUTO: 4.98 M/UL (ref 4.2–5.4)
SODIUM SERPL-SCNC: 138 MMOL/L (ref 135–145)
TROPONIN T SERPL-MCNC: <6 NG/L (ref 6–19)
WBC # BLD AUTO: 9.1 K/UL (ref 4.8–10.8)

## 2022-10-04 PROCEDURE — 80053 COMPREHEN METABOLIC PANEL: CPT

## 2022-10-04 PROCEDURE — 93005 ELECTROCARDIOGRAM TRACING: CPT | Performed by: EMERGENCY MEDICINE

## 2022-10-04 PROCEDURE — 36415 COLL VENOUS BLD VENIPUNCTURE: CPT

## 2022-10-04 PROCEDURE — 99284 EMERGENCY DEPT VISIT MOD MDM: CPT

## 2022-10-04 PROCEDURE — 71045 X-RAY EXAM CHEST 1 VIEW: CPT

## 2022-10-04 PROCEDURE — 700117 HCHG RX CONTRAST REV CODE 255: Performed by: EMERGENCY MEDICINE

## 2022-10-04 PROCEDURE — 71275 CT ANGIOGRAPHY CHEST: CPT

## 2022-10-04 PROCEDURE — 85025 COMPLETE CBC W/AUTO DIFF WBC: CPT

## 2022-10-04 PROCEDURE — 83880 ASSAY OF NATRIURETIC PEPTIDE: CPT

## 2022-10-04 PROCEDURE — 84703 CHORIONIC GONADOTROPIN ASSAY: CPT

## 2022-10-04 PROCEDURE — 93005 ELECTROCARDIOGRAM TRACING: CPT

## 2022-10-04 PROCEDURE — 84484 ASSAY OF TROPONIN QUANT: CPT

## 2022-10-04 RX ADMIN — IOHEXOL 50 ML: 350 INJECTION, SOLUTION INTRAVENOUS at 15:28

## 2022-10-04 NOTE — ED TRIAGE NOTES
"Chief Complaint   Patient presents with    Sent by MD     Pt had a R sinus surgery on 09/26 and ever since has been having CP and SOB. Sent by MD for concern of a pulmonary emboli. Pt reports CP is L sided and the SOB is on exertion.      /83   Pulse 69   Temp 36.3 °C (97.4 °F) (Temporal)   Resp 16   Ht 1.676 m (5' 6\")   Wt 97.3 kg (214 lb 8.1 oz)   SpO2 98%   BMI 34.62 kg/m²     Pt ambulatory to triage for above, pt speaking in full word sentences without difficulty.   "

## 2022-10-04 NOTE — ED PROVIDER NOTES
ED Provider Note    CHIEF COMPLAINT  Chief Complaint   Patient presents with    Sent by MD     Pt had a R sinus surgery on 09/26 and ever since has been having CP and SOB. Sent by MD for concern of a pulmonary emboli. Pt reports CP is L sided and the SOB is on exertion.        HPI  Vivian Dewey is a 31 y.o. female who presents chest pain and trouble breathing and   exertional dyspnea.  No lower extremity swelling or pain.  No history of oral contraceptive use.    She does note that she recently had a surgery 1 or 2 days prior to onset.  No fevers or recent illness.  No abdominal pain, nausea, vomiting.  No prior history of DVT or PE.    She was seen by ENT earlier today and she states that her pain at that time was about 4 out of 10 in the middle of her chest.  She was referred here for work-up regarding possible pulmonary embolism given recent surgery history.  No known cardiopulmonary disease history.  No fever though has had slight cough since the surgery.    Patient states that she was on treatment for PCOS though has not been on any medication since about a month ago.    REVIEW OF SYSTEMS  See HPI for further details. All other systems are negative.     PAST MEDICAL HISTORY   has a past medical history of PCOS (polycystic ovarian syndrome) and Psychiatric problem.    SOCIAL HISTORY  Social History     Tobacco Use    Smoking status: Never    Smokeless tobacco: Never   Vaping Use    Vaping Use: Never used   Substance and Sexual Activity    Alcohol use: Yes     Comment: 2-3 per month    Drug use: Not Currently    Sexual activity: Not on file       SURGICAL HISTORY   has a past surgical history that includes primary c section; umbilical hernia repair child; nasal scopy,rmv tiss maxill sinus (Right, 9/26/2022); and stereotactic computer assisted procedure cranial, extradural (Right, 9/26/2022).    CURRENT MEDICATIONS  Home Medications       Reviewed by Anju Shay R.N. (Registered Nurse) on 10/04/22  "at 1255  Med List Status: Partial     Medication Last Dose Status   acetaminophen (TYLENOL) 500 MG Tab  Active   ibuprofen (MOTRIN) 800 MG Tab  Active   metFORMIN (GLUCOPHAGE) 500 MG Tab  Active   Misc Natural Products (PROGESTERONE EX)  Active   ondansetron (ZOFRAN ODT) 4 MG TABLET DISPERSIBLE  Active   predniSONE (DELTASONE) 10 MG Tab  Active   spironolactone (ALDACTONE) 50 MG Tab  Active                    ALLERGIES  No Known Allergies    PHYSICAL EXAM  VITAL SIGNS: /83   Pulse 69   Temp 36.3 °C (97.4 °F) (Temporal)   Resp 16   Ht 1.676 m (5' 6\")   Wt 97.3 kg (214 lb 8.1 oz)   SpO2 98%   BMI 34.62 kg/m²   Pulse ox interpretation: I interpret this pulse ox as normal.  Constitutional: Alert in no apparent distress.  HENT: No signs of trauma, Bilateral external ears normal, Nose normal.   Eyes: Pupils are equal and reactive, Conjunctiva normal, Non-icteric.   Neck: Normal range of motion, Supple, No stridor.   Cardiovascular: Regular rate and rhythm.   Thorax & Lungs: Normal breath sounds, No respiratory distress, No wheezing, No chest tenderness.   Skin: Warm, Dry, No erythema, No rash.   Back: No bony tenderness, No CVA tenderness.   Extremities: Intact distal pulses, No edema, No cyanosis  Musculoskeletal: Good range of motion in all major joints. No major deformities noted.   Neurologic: Alert, No focal deficits noted.       DIAGNOSTIC STUDIES / PROCEDURES    EKG - Physician interpretation  Results for orders placed or performed during the hospital encounter of 10/04/22   EKG (NOW)   Result Value Ref Range    Report       [facility]    Test Date:  2022-10-04  Pt Name:    LAKSHMI ARROYO             Department: ER  MRN:        [id number]                      Room:  Gender:     Female                       Technician: [id number]  :        1990                   Requested By:ER TRIAGE PROTOCOL  Order #:    [id number]                    Reading MD: ART HASSAN, " MD    Measurements  Intervals                                Axis  Rate:       60                           P:          45  WA:         171                          QRS:        33  QRSD:       81                           T:          34  QT:         390  QTc:        390    Interpretive Statements  Sinus rhythm  No previous ECG available for comparison  Electronically Signed On 10-4-2022 13:35:10 PDT by ART HASSAN MD           LABS  Labs Reviewed   CBC WITH DIFFERENTIAL - Abnormal; Notable for the following components:       Result Value    MCHC 32.9 (*)     All other components within normal limits    Narrative:     Biotin intake of greater than 5 mg per day may interfere with  troponin levels, causing false low values.   COMP METABOLIC PANEL - Abnormal; Notable for the following components:    Glucose 103 (*)     All other components within normal limits    Narrative:     Biotin intake of greater than 5 mg per day may interfere with  troponin levels, causing false low values.   TROPONIN    Narrative:     Biotin intake of greater than 5 mg per day may interfere with  troponin levels, causing false low values.   HCG QUAL SERUM   ESTIMATED GFR    Narrative:     Biotin intake of greater than 5 mg per day may interfere with  troponin levels, causing false low values.   PROBRAIN NATRIURETIC PEPTIDE, NT    Narrative:     Biotin intake of greater than 5 mg per day may interfere with  troponin levels, causing false low values.         RADIOLOGY  CT-CTA CHEST PULMONARY ARTERY W/ RECONS   Final Result      No evidence of pulmonary embolism.            DX-CHEST-PORTABLE (1 VIEW)   Final Result      No acute cardiopulmonary disease evident.            COURSE & MEDICAL DECISION MAKING    Medications   iohexol (OMNIPAQUE) 350 mg/mL (IV) (50 mL Intravenous Given 10/4/22 1528)       Pertinent Labs & Imaging studies reviewed. (See chart for details)  31 y.o. female presenting with chest pain and shortness of breath in the setting  "of recent general anesthesia for sinus surgery last week.  Normal vital signs.  Given patient's risk factors for PE and being sent here specifically for PE evaluation, CT pulmonary angiogram was performed.  No evidence of PE at this time.  No obvious signs of pulmonary abnormalities otherwise.  EKG and troponin are unremarkable.  Patient has a heart score of 0.  Unclear etiology of the patient's symptoms at this time though work-up is very reassuring and unremarkable to this point.  She was reevaluated prior to discharge.  Normal vitals.  She appears stable for discharge.    The patient will not drink alcohol nor drive with prescribed medications.   The patient was instructed to follow-up with primary care physician for further management.  To return immediately for any worsening symptoms or development of any other concerning signs or symptoms. The patient verbalizes understanding in their own words.    /73   Pulse 60   Temp 36.4 °C (97.5 °F)   Resp 16   Ht 1.676 m (5' 6\")   Wt 97.3 kg (214 lb 8.1 oz)   SpO2 96%   BMI 34.62 kg/m²     The patient was referred to primary care where they will receive further BP management.      [facility], Emergency Dept  [facility] [facility] [address] [phone]    As needed, If symptoms worsen    Primary care doctor    Schedule an appointment as soon as possible for a visit       FINAL IMPRESSION  1. Nonspecific chest pain    2. Shortness of breath            Electronically signed by: Aquiles Asencio M.D., 10/4/2022 1:25 PM    "

## 2022-10-25 LAB — PATHOLOGY CONSULT NOTE: NORMAL

## 2024-09-30 ENCOUNTER — HOSPITAL ENCOUNTER (EMERGENCY)
Facility: MEDICAL CENTER | Age: 34
End: 2024-09-30
Payer: COMMERCIAL

## 2024-09-30 VITALS
TEMPERATURE: 96.9 F | HEART RATE: 76 BPM | OXYGEN SATURATION: 100 % | WEIGHT: 196.65 LBS | HEIGHT: 66 IN | SYSTOLIC BLOOD PRESSURE: 116 MMHG | DIASTOLIC BLOOD PRESSURE: 78 MMHG | RESPIRATION RATE: 16 BRPM | BODY MASS INDEX: 31.6 KG/M2

## 2024-09-30 PROCEDURE — 302449 STATCHG TRIAGE ONLY (STATISTIC)

## 2024-09-30 ASSESSMENT — FIBROSIS 4 INDEX: FIB4 SCORE: 0.39

## 2024-10-01 ENCOUNTER — APPOINTMENT (OUTPATIENT)
Dept: RADIOLOGY | Facility: MEDICAL CENTER | Age: 34
End: 2024-10-01
Attending: STUDENT IN AN ORGANIZED HEALTH CARE EDUCATION/TRAINING PROGRAM
Payer: COMMERCIAL

## 2024-10-01 ENCOUNTER — HOSPITAL ENCOUNTER (EMERGENCY)
Facility: MEDICAL CENTER | Age: 34
End: 2024-10-01
Attending: STUDENT IN AN ORGANIZED HEALTH CARE EDUCATION/TRAINING PROGRAM
Payer: COMMERCIAL

## 2024-10-01 VITALS
WEIGHT: 197.09 LBS | HEIGHT: 66 IN | DIASTOLIC BLOOD PRESSURE: 70 MMHG | HEART RATE: 69 BPM | OXYGEN SATURATION: 96 % | BODY MASS INDEX: 31.68 KG/M2 | TEMPERATURE: 96.8 F | RESPIRATION RATE: 16 BRPM | SYSTOLIC BLOOD PRESSURE: 129 MMHG

## 2024-10-01 DIAGNOSIS — R20.2 PARESTHESIAS: ICD-10-CM

## 2024-10-01 DIAGNOSIS — M79.604 RIGHT LEG PAIN: ICD-10-CM

## 2024-10-01 PROCEDURE — 93971 EXTREMITY STUDY: CPT | Mod: RT

## 2024-10-01 PROCEDURE — 99283 EMERGENCY DEPT VISIT LOW MDM: CPT

## 2024-10-01 ASSESSMENT — PAIN DESCRIPTION - DESCRIPTORS: DESCRIPTORS: NUMB

## 2024-10-01 ASSESSMENT — FIBROSIS 4 INDEX: FIB4 SCORE: 0.39

## 2025-01-27 ENCOUNTER — RESEARCH ENCOUNTER (OUTPATIENT)
Dept: RESEARCH | Facility: MEDICAL CENTER | Age: 35
End: 2025-01-27
Payer: COMMERCIAL

## (undated) DEVICE — SWAB CULTURE AMIES ESWAB (50EA/PK)

## (undated) DEVICE — SET LEADWIRE 5 LEAD BEDSIDE DISPOSABLE ECG (1SET OF 5/EA)

## (undated) DEVICE — SLEEVE VASO CALF MED - (10PR/CA)

## (undated) DEVICE — GOWN WARMING STANDARD FLEX - (30/CA)

## (undated) DEVICE — CANISTER SUCTION RIGID RED 1500CC (40EA/CA)

## (undated) DEVICE — GLOVE BIOGEL SZ 7 SURGICAL PF LTX - (50PR/BX 4BX/CA)

## (undated) DEVICE — CANISTER SUCTION 3000ML MECHANICAL FILTER AUTO SHUTOFF MEDI-VAC NONSTERILE LF DISP  (40EA/CA)

## (undated) DEVICE — SODIUM CHL IRRIGATION 0.9% 1000ML (12EA/CA)

## (undated) DEVICE — SHEATH SHARP SITE 30 DEGREE ENDOSCRUB II (5EA/PK)

## (undated) DEVICE — PUNCH 4MM SHRP CNCL TIP DL - (6/BX)

## (undated) DEVICE — TUBING ENDOSCRUB II (5EA/PK)

## (undated) DEVICE — SUTURE GENERAL

## (undated) DEVICE — SENSOR OXIMETER ADULT SPO2 RD SET (20EA/BX)

## (undated) DEVICE — KIT  I.V. START (100EA/CA)

## (undated) DEVICE — LACTATED RINGERS INJ 1000 ML - (14EA/CA 60CA/PF)

## (undated) DEVICE — SUCTION INSTRUMENT YANKAUER BULBOUS TIP W/O VENT (50EA/CA)

## (undated) DEVICE — TRACKER ENT INSTRUMENT

## (undated) DEVICE — MEDICINE CUP STERILE 2 OZ - (100/CA)

## (undated) DEVICE — BLADE INFERIOR TURBINATE 2.9MM (5EA/PK)

## (undated) DEVICE — TOWEL STOP TIMEOUT SAFETY FLAG (40EA/CA)

## (undated) DEVICE — Device

## (undated) DEVICE — TUBING CLEARLINK DUO-VENT - C-FLO (48EA/CA)

## (undated) DEVICE — PATTIES SURG X-RAYCOTTONOID - 1/2 X 3 IN (200/CA)

## (undated) DEVICE — TRACKER ENT PATIENT

## (undated) DEVICE — CANNULA W/ SUPPLY TUBING O2 - (50/CA)

## (undated) DEVICE — PACK ENT OR - (2EA/CA)

## (undated) DEVICE — WATER IRRIGATION STERILE 1000ML (12EA/CA)

## (undated) DEVICE — ANTI-FOG SOLUTION - 60BTL/CA

## (undated) DEVICE — TUBE CONNECTING SUCTION - CLEAR PLASTIC STERILE 72 IN (50EA/CA)

## (undated) DEVICE — MASK OXYGEN VNYL ADLT MED CONC WITH 7 FOOT TUBING  - (50EA/CA)